# Patient Record
Sex: MALE | Race: WHITE | Employment: FULL TIME | URBAN - METROPOLITAN AREA
[De-identification: names, ages, dates, MRNs, and addresses within clinical notes are randomized per-mention and may not be internally consistent; named-entity substitution may affect disease eponyms.]

---

## 2017-12-08 ENCOUNTER — OFFICE VISIT (OUTPATIENT)
Dept: INTERNAL MEDICINE CLINIC | Age: 57
End: 2017-12-08

## 2017-12-08 VITALS
RESPIRATION RATE: 18 BRPM | HEIGHT: 70 IN | HEART RATE: 77 BPM | WEIGHT: 153.4 LBS | BODY MASS INDEX: 21.96 KG/M2 | DIASTOLIC BLOOD PRESSURE: 88 MMHG | SYSTOLIC BLOOD PRESSURE: 129 MMHG | TEMPERATURE: 98.6 F | OXYGEN SATURATION: 99 %

## 2017-12-08 DIAGNOSIS — C61 PROSTATE CANCER (HCC): ICD-10-CM

## 2017-12-08 DIAGNOSIS — Z00.00 WELL ADULT EXAM: Primary | ICD-10-CM

## 2017-12-08 DIAGNOSIS — F51.04 PSYCHOPHYSIOLOGICAL INSOMNIA: ICD-10-CM

## 2017-12-08 NOTE — PROGRESS NOTES
1. Have you been to the ER, urgent care clinic since your last visit? Hospitalized since your last visit?no    2. Have you seen or consulted any other health care providers outside of the [address] [place] [address] since your last visit? Include any pap smears or colon screening.  Yes    Chief Complaint   Patient presents with    Complete Physical     Not fasting

## 2017-12-08 NOTE — MR AVS SNAPSHOT
Visit Information Date & Time Provider Department Dept. Phone Encounter #  
 12/8/2017  2:00 PM Aristides Meyer MD [facility] [phone] [id number] Upcoming Health Maintenance Date Due Hepatitis C Screening 1960 Pneumococcal 19-64 Highest Risk (1 of 3 - PCV13) 9/29/1981 DTaP/Tdap/Td series (2 - Td) 10/5/2022 COLONOSCOPY 11/16/2022 Allergies as of 12/8/2017  Review Complete On: 12/8/2017 By: Annemarie Greene No Known Allergies Current Immunizations  Reviewed on 3/11/2016 Name Date Influenza Vaccine 9/22/2017, 11/20/2013 Influenza Vaccine Split 10/19/2012 TDAP Vaccine 10/5/2012 Zoster 10/19/2012 Not reviewed this visit You Were Diagnosed With   
  
 Codes Comments Well adult exam    -  Primary ICD-10-CM: Z00.00 ICD-9-CM: V70.0 Prostate cancer [facility])     ICD-10-CM: F94 ICD-9-CM: 364 Psychophysiological insomnia     ICD-10-CM: F51.04 
ICD-9-CM: 307.42 Vitals BP Pulse Temp Resp Height(growth percentile) Weight(growth percentile) 129/88 (BP 1 Location: Left arm, BP Patient Position: At rest) 77 98.6 °F (37 °C) (Oral) 18 5' 10\" (1.778 m) 153 lb 6.4 oz (69.6 kg) SpO2 BMI Smoking Status 99% 22.01 kg/m2 Never Smoker BMI and BSA Data Body Mass Index Body Surface Area 22.01 kg/m 2 1.85 m 2 Preferred Pharmacy Pharmacy Name Phone [facility] [address] [place] [address], [address] [place] [address] [phone] Your Updated Medication List  
  
   
This list is accurate as of: 12/8/17  2:34 PM.  Always use your most recent med list.  
  
  
  
  
 omega-3 fatty acids-fish oil 360-1,200 mg Cap Take 1,200 mg by mouth daily. zolpidem 10 mg tablet Commonly known as:  AMBIEN Take  by mouth nightly as needed for Sleep. We Performed the Following LIPID PANEL [16627 CPT(R)] METABOLIC PANEL, COMPREHENSIVE [05804 CPT(R)] Introducing [facility] SERVICES! Dear Sunshine Mccabe: 
Thank you for requesting a [employer] account. Our records indicate that you already have an active [employer] account. You can access your account anytime at [email or web address] Did you know that you can access your hospital and ER discharge instructions at any time in [employer]? You can also review all of your test results from your hospital stay or ER visit. Additional Information If you have questions, please visit the Frequently Asked Questions section of the [employer] website at [email or web address] Remember, [employer] is NOT to be used for urgent needs. For medical emergencies, dial 911. Now available from your iPhone and Android! Please provide this summary of care documentation to your next provider. If you have any questions after today's visit, please call [phone].

## 2017-12-08 NOTE — PROGRESS NOTES
HISTORY OF PRESENT ILLNESS  Daron Kilpatrick is a 56 y.o. male. HPI  Here for a PE. He is doing well. He has prostate cancer with active surveillance with urology. He has insomnia using ambien sparingly. He is moderately  physically active. He and his wife have on-going but stable marital issues. Past Medical History:   Diagnosis Date    Allergic rhinitis     Depression     GERD (gastroesophageal reflux disease)     IBS (irritable bowel syndrome)     Prostate cancer (HCC)     Shingles outbreak      Current Outpatient Prescriptions   Medication Sig    omega-3 fatty acids-fish oil 360-1,200 mg cap Take 1,200 mg by mouth daily.  zolpidem (AMBIEN) 10 mg tablet Take  by mouth nightly as needed for Sleep. No current facility-administered medications for this visit. Family History   Problem Relation Age of Onset    Cancer Father      prostate acute leukemia    Diabetes Father          Review of Systems   All other systems reviewed and are negative. Visit Vitals    /88 (BP 1 Location: Left arm, BP Patient Position: At rest)    Pulse 77    Temp 98.6 °F (37 °C) (Oral)    Resp 18    Ht 5' 10\" (1.778 m)    Wt 153 lb 6.4 oz (69.6 kg)    SpO2 99%    BMI 22.01 kg/m2       Physical Exam   Constitutional: He appears well-developed and well-nourished. Cardiovascular: Normal rate, regular rhythm and normal heart sounds. Pulmonary/Chest: Effort normal and breath sounds normal. No respiratory distress. He has no wheezes. He has no rales. Psychiatric: He has a normal mood and affect. His behavior is normal. Thought content normal.   Nursing note and vitals reviewed. ASSESSMENT and PLAN  Diagnoses and all orders for this visit:    1. Well adult exam  -     METABOLIC PANEL, COMPREHENSIVE  -     LIPID PANEL    2. Prostate cancer [facility])  Per urology. 3. Psychophysiological insomnia  The current medical regimen is effective;  continue present plan and medications.   Using med sparingly.       Reviewed plan of care with the patient who has provided input and agrees with the goals

## 2017-12-09 LAB
ALBUMIN SERPL-MCNC: 4.6 G/DL (ref 3.5–5.5)
ALBUMIN/GLOB SERPL: 2.1 {RATIO} (ref 1.2–2.2)
ALP SERPL-CCNC: 77 IU/L (ref 39–117)
ALT SERPL-CCNC: 23 IU/L (ref 0–44)
AST SERPL-CCNC: 18 IU/L (ref 0–40)
BILIRUB SERPL-MCNC: 0.3 MG/DL (ref 0–1.2)
BUN SERPL-MCNC: 20 MG/DL (ref 6–24)
BUN/CREAT SERPL: 25 (ref 9–20)
CALCIUM SERPL-MCNC: 9.4 MG/DL (ref 8.7–10.2)
CHLORIDE SERPL-SCNC: 103 MMOL/L (ref 96–106)
CHOLEST SERPL-MCNC: 194 MG/DL (ref 100–199)
CO2 SERPL-SCNC: 25 MMOL/L (ref 18–29)
CREAT SERPL-MCNC: 0.81 MG/DL (ref 0.76–1.27)
GFR SERPLBLD CREATININE-BSD FMLA CKD-EPI: 100 ML/MIN/1.73
GFR SERPLBLD CREATININE-BSD FMLA CKD-EPI: 116 ML/MIN/1.73
GLOBULIN SER CALC-MCNC: 2.2 G/DL (ref 1.5–4.5)
GLUCOSE SERPL-MCNC: 86 MG/DL (ref 65–99)
HDLC SERPL-MCNC: 32 MG/DL
INTERPRETATION, 910389: NORMAL
LDLC SERPL CALC-MCNC: 91 MG/DL (ref 0–99)
POTASSIUM SERPL-SCNC: 3.9 MMOL/L (ref 3.5–5.2)
PROT SERPL-MCNC: 6.8 G/DL (ref 6–8.5)
SODIUM SERPL-SCNC: 146 MMOL/L (ref 134–144)
TRIGL SERPL-MCNC: 357 MG/DL (ref 0–149)
VLDLC SERPL CALC-MCNC: 71 MG/DL (ref 5–40)

## 2022-11-18 ENCOUNTER — OFFICE VISIT (OUTPATIENT)
Dept: FAMILY MEDICINE CLINIC | Age: 62
End: 2022-11-18
Payer: COMMERCIAL

## 2022-11-18 VITALS
SYSTOLIC BLOOD PRESSURE: 129 MMHG | DIASTOLIC BLOOD PRESSURE: 90 MMHG | OXYGEN SATURATION: 97 % | HEIGHT: 68 IN | TEMPERATURE: 98.2 F | RESPIRATION RATE: 17 BRPM | BODY MASS INDEX: 23.34 KG/M2 | WEIGHT: 154 LBS | HEART RATE: 92 BPM

## 2022-11-18 DIAGNOSIS — Z00.00 VISIT FOR WELL MAN HEALTH CHECK: Primary | ICD-10-CM

## 2022-11-18 DIAGNOSIS — C61 PROSTATE CANCER (HCC): ICD-10-CM

## 2022-11-18 DIAGNOSIS — E78.2 ELEVATED TRIGLYCERIDES WITH HIGH CHOLESTEROL: ICD-10-CM

## 2022-11-18 DIAGNOSIS — K21.9 GASTROESOPHAGEAL REFLUX DISEASE WITHOUT ESOPHAGITIS: ICD-10-CM

## 2022-11-18 NOTE — PROGRESS NOTES
Chief Complaint   Patient presents with    [facility]    Well Male    Referral Request     GI     1. Have you been to the ER, urgent care clinic since your last visit? Hospitalized since your last visit? N/A    2. Have you seen or consulted any other health care providers outside of the [address] [place] [address] since your last visit? Include any pap smears or colon screening.  N/A

## 2022-11-18 NOTE — PROGRESS NOTES
[address] [facility] [address]   Office [phone], Fax [phone]    Subjective:     Chief Complaint   Patient presents with    [facility] Male    Referral Request     GI       HPI:  Meka Petersen is a 63 y.o. male with a history of prostate cancer, depression that presents for: New Patient Visit. No acute concerns. Hx Prostate Cancer:   - follows with Urologist q6 months with Dr. Michele Lentz at [facility], currently under active surveillance. Dx ~7 years ago. Hx Insomnia:   - has taken Ambien in the past, but has not taken at all recently    GERD:   Takes Tums irregularly     Cataracts:   - s/p surgical correction of left eye     Alcohol use:   - drinks less than a glass with meals         Health Maintenance:  Health Maintenance Due   Topic Date Due    Hepatitis C Screening  Never done    Depression Monitoring  Never done    Shingrix Vaccine Age 50> (1 of 2) Never done    DTaP/Tdap/Td series (2 - Td or Tdap) 10/05/2022    Colorectal Cancer Screening Combo  11/16/2022          Past Medical Hx  I personally reviewed. Past Medical History:   Diagnosis Date    Allergic rhinitis     Depression     GERD (gastroesophageal reflux disease)     IBS (irritable bowel syndrome)     Prostate cancer ([facility]     Shingles outbreak         SocHx   I personally reviewed. Social History     Socioeconomic History    Marital status:      Spouse name: Not on file    Number of children: Not on file    Years of education: Not on file    Highest education level: Not on file   Occupational History    Not on file   Tobacco Use    Smoking status: Never    Smokeless tobacco: Never   Substance and Sexual Activity    Alcohol use: Yes    Drug use: No    Sexual activity: Not Currently     Partners: Female     Comment: . 2 children. [address] [place] [address]. PHYSICS.    Other Topics Concern    Not on file   Social History Narrative    Not on file     Social Determinants of Health     Financial Resource Strain: Not on file   Food Insecurity: Not on file   Transportation Needs: Not on file   Physical Activity: Not on file   Stress: Not on file   Social Connections: Not on file   Intimate Partner Violence: Not on file   Housing Stability: Not on file        Allergies  I personally reviewed. No Known Allergies     Medications  I personally reviewed. Current Outpatient Medications on File Prior to Visit   Medication Sig Dispense Refill    omega-3 fatty acids-fish oil 360-1,200 mg cap Take 1,200 mg by mouth daily. (Patient not taking: Reported on 11/18/2022)      zolpidem (AMBIEN) 10 mg tablet Take  by mouth nightly as needed for Sleep. (Patient not taking: Reported on 11/18/2022)       No current facility-administered medications on file prior to visit. ROS:   Review of Systems   Constitutional:  Negative for chills and fever. HENT:  Negative for congestion, ear pain, hearing loss and sore throat. Eyes:  Negative for blurred vision and pain. Respiratory:  Negative for cough and shortness of breath. Cardiovascular:  Negative for chest pain, palpitations and leg swelling. Gastrointestinal:  Negative for abdominal pain, diarrhea, nausea and vomiting. Genitourinary:  Negative for dysuria. Musculoskeletal:  Negative for back pain. Skin:  Negative for rash. Neurological:  Negative for sensory change, focal weakness and headaches. Endo/Heme/Allergies:  Does not bruise/bleed easily. Psychiatric/Behavioral:  Negative for depression and suicidal ideas. Objective:   Vitals  I personally reviewed. Visit Vitals  BP (!) 129/90   Pulse 92   Temp 98.2 °F (36.8 °C) (Temporal)   Resp 17   Ht 5' 8\" (1.727 m)   Wt 154 lb (69.9 kg)   SpO2 97%   BMI 23.42 kg/m²        Physical Exam:    Physical Exam  Vitals reviewed. Constitutional:       General: He is not in acute distress. Appearance: Normal appearance. HENT:      Head: Normocephalic and atraumatic.       Right Ear: External ear normal. Left Ear: External ear normal.      Nose: Nose normal.      Mouth/Throat:      Mouth: Mucous membranes are moist.      Pharynx: Oropharynx is clear. Eyes:      Extraocular Movements: Extraocular movements intact. Pupils: Pupils are equal, round, and reactive to light. Cardiovascular:      Rate and Rhythm: Normal rate and regular rhythm. Pulses: Normal pulses. Heart sounds: Normal heart sounds. No murmur heard. Pulmonary:      Effort: Pulmonary effort is normal. No respiratory distress. Breath sounds: Normal breath sounds. No stridor. No wheezing. Abdominal:      General: Abdomen is flat. Palpations: Abdomen is soft. Tenderness: There is no abdominal tenderness. Musculoskeletal:         General: No tenderness. Normal range of motion. Cervical back: Normal range of motion. No rigidity or tenderness. Skin:     General: Skin is warm and dry. Capillary Refill: Capillary refill takes less than 2 seconds. Findings: No rash. Neurological:      General: No focal deficit present. Mental Status: He is alert and oriented to person, place, and time. Psychiatric:         Mood and Affect: Mood normal.         Behavior: Behavior normal.          Assessment/Plan:     67yo male with history of prostate cancer presenting to establish care. Exam unremarkable. Preventative screening updated and discussed. Initial labs ordered as below. Will refer to GI for routine colonoscopy. Obtain immunization records. Follow up for yearly physical, or sooner pending lab results. Diagnoses and all orders for this visit:    1. Visit for well man health check  -     REFERRAL TO GASTROENTEROLOGY  -     LIPID PANEL; Future  -     CBC W/O DIFF; Future  -     METABOLIC PANEL, COMPREHENSIVE; Future  -     HEPATITIS C AB; Future  -     MO DEPRESSION SCREEN ANNUAL    2. Elevated triglycerides with high cholesterol    3. Gastroesophageal reflux disease without esophagitis    4.  Prostate cancer [facility])       Follow-up and Dispositions    Return in about 1 year (around 11/18/2023) for yearly physical.              Pt was discussed with Dr Campbell Kessler (attending physician). I have reviewed patient medical and social history and medications. I have reviewed pertinent labs results and other data. I have discussed the diagnosis with the patient and the intended plan as seen in the above orders. The patient has received an after-visit summary and questions were answered concerning future plans. I have discussed medication side effects and warnings with the patient as well.     Jian Posada MD  Resident [facility]  11/18/22

## 2022-11-24 LAB
ALBUMIN SERPL-MCNC: 4.8 G/DL (ref 3.8–4.9)
ALBUMIN/GLOB SERPL: 2.4 {RATIO} (ref 1.2–2.2)
ALP SERPL-CCNC: 86 IU/L (ref 44–121)
ALT SERPL-CCNC: 21 IU/L (ref 0–44)
AST SERPL-CCNC: 21 IU/L (ref 0–40)
BILIRUB SERPL-MCNC: 0.5 MG/DL (ref 0–1.2)
BUN SERPL-MCNC: 13 MG/DL (ref 8–27)
BUN/CREAT SERPL: 15 (ref 10–24)
CALCIUM SERPL-MCNC: 9.4 MG/DL (ref 8.6–10.2)
CHLORIDE SERPL-SCNC: 106 MMOL/L (ref 96–106)
CHOLEST SERPL-MCNC: 190 MG/DL (ref 100–199)
CO2 SERPL-SCNC: 26 MMOL/L (ref 20–29)
CREAT SERPL-MCNC: 0.88 MG/DL (ref 0.76–1.27)
EGFR: 98 ML/MIN/1.73
ERYTHROCYTE [DISTWIDTH] IN BLOOD BY AUTOMATED COUNT: 12.5 % (ref 11.6–15.4)
GLOBULIN SER CALC-MCNC: 2 G/DL (ref 1.5–4.5)
GLUCOSE SERPL-MCNC: 82 MG/DL (ref 70–99)
HCT VFR BLD AUTO: 47.7 % (ref 37.5–51)
HCV AB S/CO SERPL IA: <0.1 S/CO RATIO (ref 0–0.9)
HDLC SERPL-MCNC: 31 MG/DL
HGB BLD-MCNC: 16 G/DL (ref 13–17.7)
IMP & REVIEW OF LAB RESULTS: NORMAL
LDLC SERPL CALC-MCNC: 112 MG/DL (ref 0–99)
MCH RBC QN AUTO: 29.6 PG (ref 26.6–33)
MCHC RBC AUTO-ENTMCNC: 33.5 G/DL (ref 31.5–35.7)
MCV RBC AUTO: 88 FL (ref 79–97)
PLATELET # BLD AUTO: 250 X10E3/UL (ref 150–450)
POTASSIUM SERPL-SCNC: 4.4 MMOL/L (ref 3.5–5.2)
PROT SERPL-MCNC: 6.8 G/DL (ref 6–8.5)
RBC # BLD AUTO: 5.41 X10E6/UL (ref 4.14–5.8)
SODIUM SERPL-SCNC: 145 MMOL/L (ref 134–144)
TRIGL SERPL-MCNC: 267 MG/DL (ref 0–149)
VLDLC SERPL CALC-MCNC: 47 MG/DL (ref 5–40)
WBC # BLD AUTO: 6.1 X10E3/UL (ref 3.4–10.8)

## 2023-02-24 ENCOUNTER — VIRTUAL VISIT (OUTPATIENT)
Dept: FAMILY MEDICINE CLINIC | Age: 63
End: 2023-02-24

## 2023-02-24 DIAGNOSIS — U07.1 COVID-19: Primary | ICD-10-CM

## 2023-02-24 NOTE — PROGRESS NOTES
Daron Hunt  63 y.o. male  1960 [address]  [place] [address]  [id number]   [address]:    Telemedicine Progress Note  Ermias Miguel MD       Encounter Date and Time: February 24, 2023 at 1:29 PM    Consent: Stella Wills, who was seen by synchronous (real-time) audio-video technology, and/or his healthcare decision maker, is aware that this patient-initiated, Telehealth encounter on 2/24/2023 is a billable service, with coverage as determined by his insurance carrier. He is aware that he may receive a bill and has provided verbal consent to proceed: Yes. Daron Hunt, was evaluated through a synchronous (real-time) audio-video encounter. The patient (or guardian if applicable) is aware that this is a billable service, which includes applicable co-pays. This Virtual Visit was conducted with patient's (and/or legal guardian's) consent. The visit was conducted pursuant to the emergency declaration under the [address] [place] [address] authority and the [email or web address] Act. Patient identification was verified, and a caregiver was present when appropriate.   The patient was located at: Home: [place] [address] [address] [place] [address]  The provider was located at: Home: Beth Vasquez MD on 2/24/2023 at 1:29 PM    Chief Complaint   Patient presents with    Positive For Covid-19     History of Present Illness   Daron Zarate is a 63 y.o. male was evaluated by synchronous (real-time) audio-video technology from home, through a secure patient portal.    # COVID-19:  - Onset of Sx: 2/16  - Context: Patient started out with a fever and headache on 2/16   - Symptoms: Subjective fevers, headaches, fatigue (all now improved or resolved), now with a residual non-productive dry cough, itchy throat  - Denies: SOB, dizziness, SKINNER  - Tested (+) on: on 2/17, 2/21, 2/23  - COVID vaccination status: Pfizer x5 (including bivalent)  - Chronic conditions: Prostate cancer (followed for active surveillance, no chemo/radiation history). No other chronic conditions. No home meds. Review of Systems   Review of Systems   Constitutional: Negative. Negative for chills and fever. HENT:  Positive for congestion. Negative for sore throat. Eyes: Negative. Respiratory:  Positive for cough. Negative for sputum production and shortness of breath. Cardiovascular: Negative. Negative for chest pain, palpitations and orthopnea. Gastrointestinal: Negative. Negative for abdominal pain, diarrhea, nausea and vomiting. Skin: Negative. Neurological:  Negative for headaches. Vitals/Objective:     General: alert, cooperative, no distress   Mental  status: mental status: normal mood, behavior, speech, dress, motor activity, and thought processes   Resp: resp: normal effort, no respiratory distress, and no accessory muscle use   Neuro: neuro: no gross deficits   Skin: skin: no discoloration or lesions of concern on visible areas   Due to this being a TeleHealth evaluation, many elements of the physical examination are unable to be assessed. Assessment and Plan:   Assessment/Plan:  April AGUILLON here for COVID-19 guidance. Diagnoses and all orders for this visit:    1. COVID-19: Acute. Improving. Sx onset 2/16. Tested (+) first on 2/17. Vaccinated x 5 (including bivalent). No red flag symptoms. Outside of window to initiate Paxlovid and would have been a likely poor candidate given mildness of symptoms. Reviewed conservative management at home. Lengthy discussion with patient regarding isolation standards per CDC. ER precautions reviewed. Time spent in direct conversation with the patient to include medical condition(s) discussed, assessment and treatment plan:       We discussed the expected course, resolution and complications of the diagnosis(es) in detail.   Medication risks, benefits, costs, interactions, and alternatives were discussed as indicated. I advised him to contact the office if his condition worsens, changes or fails to improve as anticipated. He expressed understanding with the diagnosis(es) and plan. Patient understands that this encounter was a temporary measure, and the importance of further follow up and examination was emphasized. Patient verbalized understanding. Patient informed to follow up: PRN. Electronically Signed: Armen Hsu MD  Case reviewed with Dr. Mu Whitley [facility] Attending)      CPT Codes 05401-66188 for Established Patients may apply to this Telehealth Visit. POS code: 18. Modifier GT    Daron Quintero is a 63 y.o. male who was evaluated by an audio-video encounter for concerns as above. Patient identification was verified prior to start of the visit. A caregiver was present when appropriate. Due to this being a TeleHealth encounter (During [facility] public health emergency), evaluation of the following organ systems was limited: Vitals/Constitutional/EENT/Resp/CV/GI//MS/Neuro/Skin/Heme-Lymph-Imm. Pursuant to the emergency declaration under the [facility], [facility] waiver authority and the [employer] Act, this Virtual Visit was conducted, with patient's (and/or legal guardian's) consent, to reduce the patient's risk of exposure to COVID-19 and provide necessary medical care. Services were provided through a synchronous discussion virtually to substitute for in-person clinic visit. I was at home. The patient was at home. History   Patients past medical, surgical and family histories were reviewed and updated.       Past Medical History:   Diagnosis Date    Allergic rhinitis     Depression     GERD (gastroesophageal reflux disease)     IBS (irritable bowel syndrome)     Prostate cancer ([facility] [address]     Shingles outbreak      Past Surgical History:   Procedure Laterality Date    HX CATARACT REMOVAL Left 2021    HX COLONOSCOPY  01/01/2012    HX TONSILLECTOMY  1968     Family History   Problem Relation Age of Onset    No Known Problems Mother     Cancer Father         prostate acute leukemia    Diabetes Father     No Known Problems Brother     No Known Problems Maternal Grandmother     No Known Problems Maternal Grandfather     No Known Problems Paternal Grandmother     Cancer Paternal Grandfather         throat (heavy smoker)     Social History     Socioeconomic History    Marital status:      Spouse name: Not on file    Number of children: Not on file    Years of education: Not on file    Highest education level: Not on file   Occupational History    Not on file   Tobacco Use    Smoking status: Never    Smokeless tobacco: Never   Substance and Sexual Activity    Alcohol use: Yes    Drug use: No    Sexual activity: Not Currently     Partners: Female     Comment: . 2 children. [address]. PHYSICS. Other Topics Concern    Not on file   Social History Narrative    Not on file     Social Determinants of Health     Financial Resource Strain: Not on file   Food Insecurity: Not on file   Transportation Needs: Not on file   Physical Activity: Not on file   Stress: Not on file   Social Connections: Not on file   Intimate Partner Violence: Not on file   Housing Stability: Not on file     Patient Active Problem List   Diagnosis Code    GERD (gastroesophageal reflux disease) K21.9    Seasonal allergic rhinitis J30.2    IBS (irritable bowel syndrome) K58.9    Depression F32. A    [facility] C61          Current Medications/Allergies   Medications and Allergies reviewed:      No Known Allergies